# Patient Record
Sex: FEMALE | Race: WHITE
[De-identification: names, ages, dates, MRNs, and addresses within clinical notes are randomized per-mention and may not be internally consistent; named-entity substitution may affect disease eponyms.]

---

## 2021-09-27 ENCOUNTER — HOSPITAL ENCOUNTER (EMERGENCY)
Dept: HOSPITAL 97 - ER | Age: 41
Discharge: HOME | End: 2021-09-27
Payer: SELF-PAY

## 2021-09-27 DIAGNOSIS — I10: ICD-10-CM

## 2021-09-27 DIAGNOSIS — Z88.7: ICD-10-CM

## 2021-09-27 DIAGNOSIS — Z20.822: ICD-10-CM

## 2021-09-27 DIAGNOSIS — J06.9: Primary | ICD-10-CM

## 2021-09-27 LAB — SARS-COV-2 RNA RESP QL NAA+PROBE: NEGATIVE

## 2021-09-27 PROCEDURE — 71045 X-RAY EXAM CHEST 1 VIEW: CPT

## 2021-09-27 PROCEDURE — 87081 CULTURE SCREEN ONLY: CPT

## 2021-09-27 PROCEDURE — 99282 EMERGENCY DEPT VISIT SF MDM: CPT

## 2021-09-27 PROCEDURE — 0240U: CPT

## 2021-09-27 PROCEDURE — 87070 CULTURE OTHR SPECIMN AEROBIC: CPT

## 2021-09-27 NOTE — EDPHYS
Physician Documentation                                                                           

 [facility]                                                                 

Name: Alexia Cristina                                                                               

Age: 41 yrs                                                                                       

Sex: Female                                                                                       

: 1980                                                                                   

MRN: [id number]                                                                                   

Arrival Date: 2021                                                                          

Time: 11:40                                                                                       

Account#: [id number]                                                                            

Bed DX3                                                                                           

Private MD: Cesar Malin S                                                                    

ED Physician Lucas Isabel                                                                      

HPI:                                                                                              

                                                                                             

14:05 This 41 yrs old  Female presents to ER via Ambulatory with complaints of       kb  

      Cough, Fever, Congestion, Sore Throat.                                                      

14:05 The patient or guardian reports cough, that is intermittent, described as moderate,     kb  

      difficulty breathing. Onset: The symptoms/episode began/occurred 6 day(s) ago. Severity     

      of symptoms: At their worst the symptoms were moderate, in the emergency department the     

      symptoms are unchanged. Modifying factors: The symptoms are alleviated by nothing, the      

      symptoms are aggravated by nothing. Associated signs and symptoms: Pertinent positives:     

      sore throat. The patient has not experienced similar symptoms in the past. The patient      

      has not recently seen a physician.                                                          

14:07 Pt reports cough, congestion, sore throat and shortness of breath since Wednesday.      kb  

                                                                                                  

Historical:                                                                                       

- Allergies:                                                                                      

11:47 flu vaccine;                                                                            aa5 

11:47 Hepatitis Vaccine;                                                                      aa5 

- PMHx:                                                                                           

11:47 Diabetes - NIDDM; Hypertension; SWELLING TO LEGS; Depressive disorder; Anxiety; thyroid aa5 

      problem;                                                                                    

- PSHx:                                                                                           

11:47 tubal ligation; Thyroidectomy;                                                          aa5 

                                                                                                  

- Immunization history:: Client reports having NOT received the Covid vaccine.                    

- Social history:: Smoking status: Patient denies any tobacco usage or history of.                

                                                                                                  

                                                                                                  

ROS:                                                                                              

14:04 Constitutional: Negative for fever, chills, and weight loss.                            kb  

14:04 ENT: Positive for sore throat.                                                              

14:04 Respiratory: Positive for cough, shortness of breath, Negative for dyspnea on exertion,     

      hemoptysis, orthopnea, pleurisy, sputum production, wheezing.                               

14:04 All other systems are negative.                                                             

                                                                                                  

Exam:                                                                                             

14:05 Constitutional:  This is a well developed, well nourished patient who is awake, alert,  kb  

      and in no acute distress. Head/Face:  Normocephalic, atraumatic. ENT:  Moist Mucous         

      membranes Cardiovascular:  Regular rate and rhythm with a normal S1 and S2.  No             

      gallops, murmurs, or rubs.  No pulse deficits. Respiratory:  Respirations even and          

      unlabored. No increased work of breathing, no retractions or nasal flaring. Skin:           

      Warm, dry with normal turgor.  Normal color. MS/ Extremity:  Pulses equal, no cyanosis.     

       Neurovascular intact.  Full, normal range of motion. Neuro:  Awake and alert, GCS 15,      

      oriented to person, place, time, and situation. Moves all extremities. Normal gait.         

      Psych:  Awake, alert, with orientation to person, place and time.  Behavior, mood, and      

      affect are within normal limits.                                                            

                                                                                                  

Vital Signs:                                                                                      

11:48  / 110; Pulse 107; Resp 20 S; Temp 98.7(O); Pulse Ox 100% on R/A; Weight 137.44   aa5 

      kg (R); Height 5 ft. 4 in. (162.56 cm) (R);                                                 

11:48 Body Mass Index 52.01 (137.44 kg, 162.56 cm)                                            aa5 

                                                                                                  

MDM:                                                                                              

13:56 Patient medically screened.                                                             kb  

14:04 Data reviewed: vital signs, nurses notes. Data interpreted: Pulse oximetry: on room air kb  

      is 100 %. Interpretation: normal. Counseling: I had a detailed discussion with the          

      patient and/or guardian regarding: the historical points, exam findings, and any            

      diagnostic results supporting the discharge/admit diagnosis, lab results, radiology         

      results, the need for outpatient follow up, a family practitioner, to return to the         

      emergency department if symptoms worsen or persist or if there are any questions or         

      concerns that arise at home.                                                                

                                                                                                  

                                                                                             

11:45 Order name: Strep; Complete Time: 13:33                                                 kb  

                                                                                             

11:51 Order name: Chest Single View XRAY                                                      [facility] 

                                                                                             

13:09 Order name: COVID-19/FLU A+B; Complete Time: 13:12                                      EDMS

                                                                                             

13:36 Order name: Throat Culture                                                              [facility]

                                                                                             

14:10 Order name: RAD; Complete Time: 14:16                                                   EDMS

                                                                                                  

Administered Medications:                                                                         

14:30 Drug: predniSONE 40 mg Route: PO;                                                       iw  

                                                                                                  

                                                                                                  

Disposition:                                                                                      

                                                                                             

05:52 Co-signature as Attending Physician, Lucas Isabel MD I agree with the assessment and  bimal 

      plan of care.                                                                               

                                                                                                  

Disposition Summary:                                                                              

21 14:16                                                                                    

Discharge Ordered                                                                                 

      Location: Home                                                                          kb  

      Condition: Stable                                                                       kb  

      Diagnosis                                                                                   

        - Acute upper respiratory infection, unspecified                                      kb  

      Followup:                                                                               kb  

        - With: Emergency Department                                                               

        - When: As needed                                                                          

        - Reason: Worsening of condition                                                           

      Followup:                                                                               kb  

        - With: Private Physician                                                                  

        - When: 2 - 3 days                                                                         

        - Reason: Recheck today's complaints, Continuance of care, Re-evaluation by your           

      physician                                                                                   

      Discharge Instructions:                                                                     

        - Discharge Summary Sheet                                                             kb  

        - Upper Respiratory Infection, Adult, Easy-to-Read                                    kb  

        - Viral Respiratory Infection, Easy-To-Read                                           kb  

      Forms:                                                                                      

        - Medication Reconciliation Form                                                      kb  

        - Thank You Letter                                                                    kb  

        - Antibiotic Education                                                                kb  

        - Prescription Opioid Use                                                             kb  

      Prescriptions:                                                                              

        - Prednisone 20 mg Oral Tablet                                                             

            - take 1 tablet by ORAL route once daily for 5 days; 5 tablet; Refills: 0,        kb  

      Product Selection Permitted                                                                 

        - albuterol sulfate 90 mcg/actuation Inhalation HFA aerosol inhaler                        

            - inhale 2 puff by INHALATION route every 4-6 hours As needed; 1 Inhaler;         kb  

      Refills: 0, Product Selection Permitted                                                     

Signatures:                                                                                       

Dispatcher [employer]                           EDMS                                                 

Tootie Feliz, JOHN REARDON-Lucas Alicea MD MD cha Williams, Irene, RN RN   iw                                                   

Nikki Rm RN                     RN   aa5                                                  

                                                                                                  

Corrections: (The following items were deleted from the chart)                                    

                                                                                             

12:28 11:45 CORONAVIRUS+MR.LAB.BRZ ordered. EDMS                                              EDMS

12:28 11:45 Influenza Screen (A \T\ B)+BA.LAB.BRZ ordered. EDMS                                 EDMS

                                                                                                  

**************************************************************************************************

## 2021-09-27 NOTE — ER
Nurse's Notes                                                                                     

 [facility]                                                                 

Name: Alexia Cristina                                                                               

Age: 41 yrs                                                                                       

Sex: Female                                                                                       

: 1980                                                                                   

MRN: [id number]                                                                                   

Arrival Date: 2021                                                                          

Time: 11:40                                                                                       

Account#: [id number]                                                                            

Bed DX3                                                                                           

Private MD: Cesar Malin S                                                                    

Diagnosis: Acute upper respiratory infection, unspecified                                         

                                                                                                  

Presentation:                                                                                     

                                                                                             

11:48 Chief complaint: Patient states: "I've been sick since Wednesday". Pt c/o cough,        aa5 

      subjective fever, congestion, sore throat, and SOB. Pt states "I have asthma and I've       

      been using the nebulizer and inhaler with some relief". Coronavirus screen: congestion,     

      cough unrelated to allergies. Ebola Screen: Patient negative for fever greater than or      

      equal to 101.5 degrees Fahrenheit, and additional compatible Ebola Virus Disease            

      symptoms. Initial Sepsis Screen: Does the patient meet any 2 criteria? HR > 90 bpm.         

      Does the patient have a suspected source of infection? No. Patient's initial sepsis         

      screen is negative. Risk Assessment: Do you want to hurt yourself or someone else?          

      Patient reports no desire to harm self or others. Onset of symptoms was 2021.     

11:48 Acuity: ROXI 3                                                                           aa5 

11:48 Method Of Arrival: Ambulatory                                                           aa5 

                                                                                                  

Historical:                                                                                       

- Allergies:                                                                                      

11:47 flu vaccine;                                                                            aa5 

11:47 Hepatitis Vaccine;                                                                      aa5 

- PMHx:                                                                                           

11:47 Diabetes - NIDDM; Hypertension; SWELLING TO LEGS; Depressive disorder; Anxiety; thyroid aa5 

      problem;                                                                                    

- PSHx:                                                                                           

11:47 tubal ligation; Thyroidectomy;                                                          aa5 

                                                                                                  

- Immunization history:: Client reports having NOT received the Covid vaccine.                    

- Social history:: Smoking status: Patient denies any tobacco usage or history of.                

                                                                                                  

                                                                                                  

Vital Signs:                                                                                      

11:48  / 110; Pulse 107; Resp 20 S; Temp 98.7(O); Pulse Ox 100% on R/A; Weight 137.44   aa5 

      kg (R); Height 5 ft. 4 in. (162.56 cm) (R);                                                 

11:48 Body Mass Index 52.01 (137.44 kg, 162.56 cm)                                            aa5 

                                                                                                  

ED Course:                                                                                        

11:40 Patient arrived in ED.                                                                  am2 

11:40 Cesar Malin MD is Private Physician.                                               am2 

11:44 [facility]                                                        kb  

11:44 Lucas Isabel MD is Attending Physician.                                             kb  

11:46 Arm band placed on.                                                                     aa5 

11:50 Triage completed.                                                                       aa5 

14:23 Nimisha Garcia, RN is Primary Nurse.                                                   iw  

                                                                                                  

Administered Medications:                                                                         

14:30 Drug: predniSONE 40 mg Route: PO;                                                       iw  

                                                                                                  

                                                                                                  

Outcome:                                                                                          

14:16 Discharge ordered by MD.                                                                kb  

14:30 Patient left the ED.                                                                    iw  

                                                                                                  

Signatures:                                                                                       

Tootie Feliz FNP-C FNP-Nimisha Montes De Oca, RN                     RN   iw                                                   

Nikki Rm RN                     RN   aa5                                                  

Keira Salazar                               am2                                                  

                                                                                                  

**************************************************************************************************

## 2021-09-27 NOTE — RAD REPORT
EXAM DESCRIPTION:  RAD - Chest Single View - 9/27/2021 1:46 pm

 

CLINICAL HISTORY:  COUGH

Chest pain.

 

COMPARISON:  CHEST PA AND LAT 2 VIEW dated 10/11/2009; ABDOMEN ACUTE SERIES dated 5/24/2003

 

FINDINGS:  Portable technique limits examination quality.

 

The lungs are grossly clear. The heart is normal in size. No displaced fractures.

 

IMPRESSION:  No acute intrathoracic process suspected.

## 2021-09-28 VITALS — OXYGEN SATURATION: 100 % | SYSTOLIC BLOOD PRESSURE: 153 MMHG | TEMPERATURE: 98.7 F | DIASTOLIC BLOOD PRESSURE: 110 MMHG
